# Patient Record
Sex: MALE
[De-identification: names, ages, dates, MRNs, and addresses within clinical notes are randomized per-mention and may not be internally consistent; named-entity substitution may affect disease eponyms.]

---

## 2017-02-18 ENCOUNTER — CHARTING TRANS (OUTPATIENT)
Dept: OTHER | Age: 72
End: 2017-02-18

## 2018-04-27 NOTE — H&P
[facility]    PATIENT'S NAME: Yaritza Hernández   ATTENDING PHYSICIAN: Jermaine Ramirez.  Jonathan Birmingham MD   PATIENT ACCOUNT#:   [id number]    LOCATION:    MEDICAL RECORD #:   [id number]       YOB: 1945  ADMISSION DATE:       05/08/2018    HISTO RECOMMENDATION:  Definitive excision of the area with frozen section, control of margins, pathology, and local flap reconstruction.   Utilizing drawings and diagrams, outlining directly on the patient's arm area, we discussed the nature of this procedu

## 2018-05-02 RX ORDER — BUPRENORPHINE HCL 8 MG/1
1 TABLET SUBLINGUAL DAILY
COMMUNITY

## 2018-05-08 ENCOUNTER — SURGERY (OUTPATIENT)
Age: 73
End: 2018-05-08

## 2018-05-08 ENCOUNTER — HOSPITAL ENCOUNTER (OUTPATIENT)
Facility: HOSPITAL | Age: 73
Setting detail: HOSPITAL OUTPATIENT SURGERY
Discharge: HOME | End: 2018-05-08
Attending: PLASTIC SURGERY | Admitting: PLASTIC SURGERY
Payer: COMMERCIAL

## 2018-05-08 VITALS
WEIGHT: 215 LBS | HEIGHT: 74 IN | DIASTOLIC BLOOD PRESSURE: 80 MMHG | HEART RATE: 76 BPM | OXYGEN SATURATION: 94 % | SYSTOLIC BLOOD PRESSURE: 115 MMHG | BODY MASS INDEX: 27.59 KG/M2 | RESPIRATION RATE: 16 BRPM

## 2018-05-08 DIAGNOSIS — C44.612 BASAL CELL CARCINOMA (BCC) OF RIGHT UPPER EXTREMITY: Primary | ICD-10-CM

## 2018-05-08 PROBLEM — C44.611: Status: ACTIVE | Noted: 2018-05-08

## 2018-05-08 PROCEDURE — 88305 TISSUE EXAM BY PATHOLOGIST: CPT | Performed by: PLASTIC SURGERY

## 2018-05-08 PROCEDURE — 88332 PATH CONSLTJ SURG EA ADD BLK: CPT | Performed by: PLASTIC SURGERY

## 2018-05-08 PROCEDURE — 88331 PATH CONSLTJ SURG 1 BLK 1SPC: CPT | Performed by: PLASTIC SURGERY

## 2018-05-08 PROCEDURE — 0HBBXZZ EXCISION OF RIGHT UPPER ARM SKIN, EXTERNAL APPROACH: ICD-10-PCS | Performed by: PLASTIC SURGERY

## 2018-05-08 RX ORDER — DIAPER,BRIEF,INFANT-TODD,DISP
EACH MISCELLANEOUS AS NEEDED
Status: DISCONTINUED | OUTPATIENT
Start: 2018-05-08 | End: 2018-05-08 | Stop reason: HOSPADM

## 2018-05-08 RX ORDER — ACETAMINOPHEN 325 MG/1
650 TABLET ORAL EVERY 6 HOURS PRN
Status: DISCONTINUED | OUTPATIENT
Start: 2018-05-08 | End: 2018-05-08

## 2018-05-08 NOTE — INTERVAL H&P NOTE
Pre-op Diagnosis: basal cell cancer right upper arm    The above referenced H&P was reviewed by Byron Fletcher MD on 5/8/2018, the patient was examined and no significant changes have occurred in the patient's condition since the H&P was performed.

## 2018-05-08 NOTE — OPERATIVE REPORT
[facility]    PATIENT'S NAME: Sissy FLEMING   ATTENDING PHYSICIAN: Markos Segura. Jose Kaur MD   OPERATING PHYSICIAN: Markos Segura.  Jose Kaur MD   PATIENT ACCOUNT#:   [id number]    LOCATION:  [address]  MEDICAL RECORD #:   [id number] anteroinferior and posterosuperior aspect of the defects. These were incised with a #15 blade and dissected with fine-tip iris scissors. Hemostasis was achieved with electrocautery.   Flaps were transposed, advanced, and inset with 3-0 Monocryl inverted i

## 2018-05-08 NOTE — DISCHARGE SUMMARY
[facility]    Discharge Summary    Vik Peoples Patient Status:  Hospital Outpatient Surgery    1945 MRN [id number]   Location [address] [facility] Attending Deon Kiser, Missael Vegas MD   Hosp Day # 0 PCP Ja Velazquez Dab wound dry, do not rub across it. 3. Cream:  Apply 1% hydrocortisone cream one time daily over the wound, stitches, and any surrounding dry irritated skin. Reapply a dressing pad over the wound per doctor’s instructions.   You can buy this cream ov

## 2018-05-08 NOTE — BRIEF OP NOTE
Pre-Operative Diagnosis: basal cell cancer right upper arm     Post-Operative Diagnosis: basal cell cancer right upper arm      Procedure Performed:   Procedure(s):  excision basal cell cancer right upper arm with frozen section pathology and flap reconstr

## 2018-11-05 VITALS
HEIGHT: 74 IN | TEMPERATURE: 97.9 F | HEART RATE: 68 BPM | BODY MASS INDEX: 27.34 KG/M2 | DIASTOLIC BLOOD PRESSURE: 78 MMHG | RESPIRATION RATE: 16 BRPM | WEIGHT: 213 LBS | SYSTOLIC BLOOD PRESSURE: 122 MMHG

## 2021-01-28 PROBLEM — C44.611: Status: RESOLVED | Noted: 2018-05-08 | Resolved: 2021-01-28

## 2021-01-28 PROBLEM — Z12.11 SCREENING FOR MALIGNANT NEOPLASM OF COLON: Status: ACTIVE | Noted: 2021-01-28

## 2021-01-28 PROBLEM — Z12.5 SCREENING FOR PROSTATE CANCER: Status: ACTIVE | Noted: 2021-01-28

## 2021-01-28 PROBLEM — E66.3 OVERWEIGHT (BMI 25.0-29.9): Status: ACTIVE | Noted: 2021-01-28

## 2021-01-28 PROBLEM — M16.12 PRIMARY OSTEOARTHRITIS OF LEFT HIP: Status: ACTIVE | Noted: 2021-01-28

## 2021-01-28 PROBLEM — Z85.828 PERSONAL HISTORY OF SKIN CANCER: Status: ACTIVE | Noted: 2021-01-28

## (undated) DEVICE — STERILE LATEX POWDER-FREE SURGICAL GLOVESWITH NITRILE COATING: Brand: PROTEXIS

## (undated) DEVICE — STERILE TETRA-FLEX CF, ELASTIC BANDAGE, 4" X 5.5YD: Brand: TETRA-FLEX™CF

## (undated) DEVICE — NON-ADHERENT STRIPS,OIL EMULSION: Brand: CURITY

## (undated) DEVICE — SYRINGE MNJCT 10ML LF STRL REG

## (undated) DEVICE — CLIPPER BLADE 3M

## (undated) DEVICE — SUTURE SILK 4-0 P-3

## (undated) DEVICE — SUTURE MONOCRYL 3-0 Y497G

## (undated) DEVICE — OUTPATIENT: Brand: MEDLINE INDUSTRIES, INC.

## (undated) DEVICE — REM POLYHESIVE ADULT PATIENT RETURN ELECTRODE: Brand: VALLEYLAB

## (undated) DEVICE — SOL  .9 1000ML BTL

## (undated) DEVICE — SUTURE ETHILON 4-0 699G

## (undated) DEVICE — SUTURE MONOCRYL 4-0 Y835G

## (undated) DEVICE — CAUTERY BLADE 2IN INS E1455

## (undated) DEVICE — SUCTION CANISTER, 3000CC,SAFELINER: Brand: DEROYAL